# Patient Record
Sex: MALE | Race: WHITE | Employment: FULL TIME | URBAN - METROPOLITAN AREA
[De-identification: names, ages, dates, MRNs, and addresses within clinical notes are randomized per-mention and may not be internally consistent; named-entity substitution may affect disease eponyms.]

---

## 2017-05-30 PROBLEM — G89.29 CHRONIC RIGHT SHOULDER PAIN: Status: ACTIVE | Noted: 2017-05-30

## 2017-05-30 PROBLEM — M25.511 CHRONIC RIGHT SHOULDER PAIN: Status: ACTIVE | Noted: 2017-05-30

## 2017-05-30 PROBLEM — B00.9 HERPES INFECTION: Status: ACTIVE | Noted: 2017-05-30

## 2018-08-01 ENCOUNTER — TELEPHONE (OUTPATIENT)
Dept: FAMILY MEDICINE CLINIC | Age: 48
End: 2018-08-01

## 2018-08-01 NOTE — TELEPHONE ENCOUNTER
Called patient to notify him appointment cancellation for 8/13/18 at 8am w/Dr. Loc Guerrier. Home/mobile phone was disconnected (\"subscriber not available\"). Will mail letter requesting he call the office to reschedule.